# Patient Record
Sex: MALE | Race: WHITE | NOT HISPANIC OR LATINO
[De-identification: names, ages, dates, MRNs, and addresses within clinical notes are randomized per-mention and may not be internally consistent; named-entity substitution may affect disease eponyms.]

---

## 2020-12-10 ENCOUNTER — LABORATORY RESULT (OUTPATIENT)
Age: 70
End: 2020-12-10

## 2020-12-11 ENCOUNTER — APPOINTMENT (OUTPATIENT)
Dept: DISASTER EMERGENCY | Facility: CLINIC | Age: 70
End: 2020-12-11

## 2020-12-11 ENCOUNTER — OUTPATIENT (OUTPATIENT)
Dept: OUTPATIENT SERVICES | Facility: HOSPITAL | Age: 70
LOS: 1 days | End: 2020-12-11
Payer: COMMERCIAL

## 2020-12-11 VITALS
TEMPERATURE: 98 F | RESPIRATION RATE: 12 BRPM | OXYGEN SATURATION: 100 % | HEIGHT: 72 IN | HEART RATE: 58 BPM | SYSTOLIC BLOOD PRESSURE: 154 MMHG | WEIGHT: 190.04 LBS | DIASTOLIC BLOOD PRESSURE: 90 MMHG

## 2020-12-11 DIAGNOSIS — Z98.49 CATARACT EXTRACTION STATUS, UNSPECIFIED EYE: Chronic | ICD-10-CM

## 2020-12-11 DIAGNOSIS — Z01.818 ENCOUNTER FOR OTHER PREPROCEDURAL EXAMINATION: ICD-10-CM

## 2020-12-11 DIAGNOSIS — Z90.89 ACQUIRED ABSENCE OF OTHER ORGANS: Chronic | ICD-10-CM

## 2020-12-11 DIAGNOSIS — K80.10 CALCULUS OF GALLBLADDER WITH CHRONIC CHOLECYSTITIS WITHOUT OBSTRUCTION: ICD-10-CM

## 2020-12-11 DIAGNOSIS — K80.20 CALCULUS OF GALLBLADDER WITHOUT CHOLECYSTITIS WITHOUT OBSTRUCTION: ICD-10-CM

## 2020-12-11 NOTE — H&P PST ADULT - NSANTHOSAYNRD_GEN_A_CORE
No. KIKO screening performed.  STOP BANG Legend: 0-2 = LOW Risk; 3-4 = INTERMEDIATE Risk; 5-8 = HIGH Risk

## 2020-12-11 NOTE — H&P PST ADULT - NSICDXPASTSURGICALHX_GEN_ALL_CORE_FT
PAST SURGICAL HISTORY:  History of cataract extraction bilateral, 2016    History of tonsillectomy childhood

## 2020-12-11 NOTE — H&P PST ADULT - HISTORY OF PRESENT ILLNESS
69 yo male is scheduled for laparoscopic cholecystectomy on 12/14/2020 with dr Cruz at [facility].  Patient reports abdominal discomfort for which he was diagnosed with gallbladder stones and advised removal of gallbladder.  He denies any abdominal symptoms today.

## 2020-12-11 NOTE — H&P PST ADULT - NSICDXPROBLEM_GEN_ALL_CORE_FT
PROBLEM DIAGNOSES  Problem: Gallstones  Assessment and Plan: Laparoscopic cholelecystectomy is scheduled for 12/14/2020 with physical exam on admission.  Medical clearance requested.  COVID19 PCR testing is to be schedueld today at [facility] facility.  pre op instructions were reviewed including medications.  Contact info given; best wishes offered.

## 2020-12-11 NOTE — ASU DISCHARGE PLAN (ADULT/PEDIATRIC) - ASU DC SPECIAL INSTRUCTIONSFT
REST! Apply ice packs to incision sites 20 mins on, 20 mins off every 4 hours for the first 24 hours.    If taking narcotic pain medications, may take COLACE (OTC stool softener) as directed to prevent constipation.    Increase ambulation and utilize incentive spirometer as directed.  Please call Dr. CALVIN Cruz's office ([phone]) to schedule a follow-up appointment to be seen in 7-10 days.

## 2020-12-11 NOTE — H&P PST ADULT - NSANTHTIREDRD_ENT_A_CORE
[Please see my note below.] : Please see my note below. [FreeTextEntry1] : Dear Dr. JOSTIN VIRK \par I had the pleasure of evaluating your patient AMALIA TORRES, thank you for allowing us to participate in their care. please see full note detailing our visit below.\par If you have any questions, please do not hesitate to call me and I would be happy to discuss further. \par \par Gideon Jean M.D.\par Attending Physician,  \par Department of Otolaryngology - Head and Neck Surgery\par [facility] \par Office: [phone]\par Fax: [phone]\par \par  No

## 2020-12-11 NOTE — H&P PST ADULT - NSICDXFAMILYHX_GEN_ALL_CORE_FT
FAMILY HISTORY:  Father  Still living? Unknown  Family history of pancreatic cancer, Age at diagnosis: Age Unknown    Mother  Still living? No  Family history of pancreatic cancer, Age at diagnosis: Age Unknown

## 2020-12-11 NOTE — ASU DISCHARGE PLAN (ADULT/PEDIATRIC) - CARE PROVIDER_API CALL
Anjum Cruz S  SURGERY  [facility], [address] [place] [address]  Phone: [phone]  Fax: [phone]  Follow Up Time:

## 2020-12-11 NOTE — ASU DISCHARGE PLAN (ADULT/PEDIATRIC) - CALL YOUR DOCTOR IF YOU HAVE ANY OF THE FOLLOWING:
Inability to tolerate liquids or foods/Nausea and vomiting that does not stop/Unable to urinate/Fever greater than (need to indicate Fahrenheit or Celsius)/Wound/Surgical Site with redness, or foul smelling discharge or pus/Bleeding that does not stop/Swelling that gets worse/Increased irritability or sluggishness/Numbness, tingling, color or temperature change to extremity/Pain not relieved by Medications/Excessive diarrhea

## 2020-12-13 ENCOUNTER — TRANSCRIPTION ENCOUNTER (OUTPATIENT)
Age: 70
End: 2020-12-13

## 2020-12-14 ENCOUNTER — OUTPATIENT (OUTPATIENT)
Dept: OUTPATIENT SERVICES | Facility: HOSPITAL | Age: 70
LOS: 1 days | End: 2020-12-14
Payer: COMMERCIAL

## 2020-12-14 ENCOUNTER — RESULT REVIEW (OUTPATIENT)
Age: 70
End: 2020-12-14

## 2020-12-14 VITALS
HEIGHT: 72 IN | SYSTOLIC BLOOD PRESSURE: 154 MMHG | TEMPERATURE: 98 F | RESPIRATION RATE: 12 BRPM | DIASTOLIC BLOOD PRESSURE: 91 MMHG | OXYGEN SATURATION: 100 % | HEART RATE: 58 BPM | WEIGHT: 190.48 LBS

## 2020-12-14 VITALS
RESPIRATION RATE: 19 BRPM | SYSTOLIC BLOOD PRESSURE: 140 MMHG | HEART RATE: 57 BPM | DIASTOLIC BLOOD PRESSURE: 98 MMHG | OXYGEN SATURATION: 97 %

## 2020-12-14 DIAGNOSIS — Z11.59 ENCOUNTER FOR SCREENING FOR OTHER VIRAL DISEASES: ICD-10-CM

## 2020-12-14 DIAGNOSIS — K80.10 CALCULUS OF GALLBLADDER WITH CHRONIC CHOLECYSTITIS WITHOUT OBSTRUCTION: ICD-10-CM

## 2020-12-14 DIAGNOSIS — Z01.818 ENCOUNTER FOR OTHER PREPROCEDURAL EXAMINATION: ICD-10-CM

## 2020-12-14 DIAGNOSIS — Z90.89 ACQUIRED ABSENCE OF OTHER ORGANS: Chronic | ICD-10-CM

## 2020-12-14 DIAGNOSIS — Z98.49 CATARACT EXTRACTION STATUS, UNSPECIFIED EYE: Chronic | ICD-10-CM

## 2020-12-14 LAB
ABO RH CONFIRMATION: SIGNIFICANT CHANGE UP
BLD GP AB SCN SERPL QL: SIGNIFICANT CHANGE UP

## 2020-12-14 PROCEDURE — C9399: CPT

## 2020-12-14 PROCEDURE — 47562 LAPAROSCOPIC CHOLECYSTECTOMY: CPT | Mod: AS

## 2020-12-14 PROCEDURE — G0463: CPT

## 2020-12-14 PROCEDURE — 86900 BLOOD TYPING SEROLOGIC ABO: CPT

## 2020-12-14 PROCEDURE — 36415 COLL VENOUS BLD VENIPUNCTURE: CPT

## 2020-12-14 PROCEDURE — 86901 BLOOD TYPING SEROLOGIC RH(D): CPT

## 2020-12-14 PROCEDURE — C1889: CPT

## 2020-12-14 PROCEDURE — 88304 TISSUE EXAM BY PATHOLOGIST: CPT

## 2020-12-14 PROCEDURE — 47562 LAPAROSCOPIC CHOLECYSTECTOMY: CPT

## 2020-12-14 PROCEDURE — 88304 TISSUE EXAM BY PATHOLOGIST: CPT | Mod: 26

## 2020-12-14 PROCEDURE — 86850 RBC ANTIBODY SCREEN: CPT

## 2020-12-14 RX ORDER — OXYCODONE HYDROCHLORIDE 5 MG/1
5 TABLET ORAL ONCE
Refills: 0 | Status: DISCONTINUED | OUTPATIENT
Start: 2020-12-14 | End: 2020-12-14

## 2020-12-14 RX ORDER — CEFAZOLIN SODIUM 1 G
2000 VIAL (EA) INJECTION ONCE
Refills: 0 | Status: COMPLETED | OUTPATIENT
Start: 2020-12-14 | End: 2020-12-14

## 2020-12-14 RX ORDER — ONDANSETRON 8 MG/1
4 TABLET, FILM COATED ORAL ONCE
Refills: 0 | Status: DISCONTINUED | OUTPATIENT
Start: 2020-12-14 | End: 2020-12-14

## 2020-12-14 RX ORDER — ENOXAPARIN SODIUM 100 MG/ML
40 INJECTION SUBCUTANEOUS ONCE
Refills: 0 | Status: COMPLETED | OUTPATIENT
Start: 2020-12-14 | End: 2020-12-14

## 2020-12-14 RX ORDER — HYDROMORPHONE HYDROCHLORIDE 2 MG/ML
0.5 INJECTION INTRAMUSCULAR; INTRAVENOUS; SUBCUTANEOUS
Refills: 0 | Status: DISCONTINUED | OUTPATIENT
Start: 2020-12-14 | End: 2020-12-14

## 2020-12-14 RX ORDER — OXYCODONE HYDROCHLORIDE 5 MG/1
1 TABLET ORAL
Qty: 12 | Refills: 0
Start: 2020-12-14 | End: 2020-12-16

## 2020-12-14 RX ORDER — LEVOTHYROXINE SODIUM 125 MCG
1 TABLET ORAL
Qty: 0 | Refills: 0 | DISCHARGE

## 2020-12-14 RX ORDER — SODIUM CHLORIDE 9 MG/ML
1000 INJECTION, SOLUTION INTRAVENOUS
Refills: 0 | Status: DISCONTINUED | OUTPATIENT
Start: 2020-12-14 | End: 2020-12-14

## 2020-12-14 RX ORDER — CHLORHEXIDINE GLUCONATE 213 G/1000ML
1 SOLUTION TOPICAL ONCE
Refills: 0 | Status: COMPLETED | OUTPATIENT
Start: 2020-12-14 | End: 2020-12-14

## 2020-12-14 RX ADMIN — ENOXAPARIN SODIUM 40 MILLIGRAM(S): 100 INJECTION SUBCUTANEOUS at 12:00

## 2020-12-14 RX ADMIN — SODIUM CHLORIDE 75 MILLILITER(S): 9 INJECTION, SOLUTION INTRAVENOUS at 13:51

## 2020-12-14 RX ADMIN — CHLORHEXIDINE GLUCONATE 1 APPLICATION(S): 213 SOLUTION TOPICAL at 11:00

## 2020-12-14 NOTE — PROGRESS NOTE ADULT - SUBJECTIVE AND OBJECTIVE BOX
physical exam at bedside for surgery today  no complaints  vital signs stable  NPO since 12/13/2020  too synthroid today

## 2021-02-05 NOTE — ASU PATIENT PROFILE, ADULT - PRO MENTAL HEALTH SX RECENT
[employer] Approved    Filling Pharmacy: [facility]  Additional Information: Pharmacy contacted - received paid claim for a $1 copay.  Pharmacy will contact patient when medication is ready to be picked up.          none

## 2021-10-25 PROBLEM — K80.20 CALCULUS OF GALLBLADDER WITHOUT CHOLECYSTITIS WITHOUT OBSTRUCTION: Chronic | Status: ACTIVE | Noted: 2020-12-11

## 2021-10-25 PROBLEM — E03.9 HYPOTHYROIDISM, UNSPECIFIED: Chronic | Status: ACTIVE | Noted: 2020-12-11

## 2021-11-02 ENCOUNTER — APPOINTMENT (OUTPATIENT)
Dept: CARDIOLOGY | Facility: CLINIC | Age: 71
End: 2021-11-02
Payer: COMMERCIAL

## 2021-11-02 ENCOUNTER — NON-APPOINTMENT (OUTPATIENT)
Age: 71
End: 2021-11-02

## 2021-11-02 VITALS
BODY MASS INDEX: 25.47 KG/M2 | HEART RATE: 65 BPM | WEIGHT: 188 LBS | DIASTOLIC BLOOD PRESSURE: 84 MMHG | SYSTOLIC BLOOD PRESSURE: 132 MMHG | OXYGEN SATURATION: 98 % | HEIGHT: 72 IN | TEMPERATURE: 97.1 F

## 2021-11-02 DIAGNOSIS — Z78.9 OTHER SPECIFIED HEALTH STATUS: ICD-10-CM

## 2021-11-02 PROCEDURE — 93000 ELECTROCARDIOGRAM COMPLETE: CPT

## 2021-11-02 PROCEDURE — 99205 OFFICE O/P NEW HI 60 MIN: CPT

## 2021-11-02 NOTE — DISCUSSION/SUMMARY
[FreeTextEntry1] : 1) we will monitor the loop recorder from our office to evaluate for A. fib.\par 2) he will remain on aspirin and Brilinta because of the carotid stent, but may have to add Eliquis if the loop shows A. fib.\par 3) more than likely will reduce the dose of atorvastatin.\par 4) follow-up in 1 month

## 2021-11-02 NOTE — REASON FOR VISIT
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  He also had a loop recorder implanted.

## 2021-11-09 ENCOUNTER — APPOINTMENT (OUTPATIENT)
Dept: CARDIOLOGY | Facility: CLINIC | Age: 71
End: 2021-11-09
Payer: COMMERCIAL

## 2021-11-09 VITALS
BODY MASS INDEX: 26.68 KG/M2 | OXYGEN SATURATION: 98 % | HEIGHT: 72 IN | SYSTOLIC BLOOD PRESSURE: 130 MMHG | DIASTOLIC BLOOD PRESSURE: 90 MMHG | TEMPERATURE: 98.4 F | HEART RATE: 73 BPM | WEIGHT: 197 LBS

## 2021-11-09 PROCEDURE — 93291 INTERROG DEV EVAL SCRMS IP: CPT

## 2021-11-09 NOTE — PROCEDURE
[de-identified] : [facility] [de-identified] : M[facility] [de-identified] : [id number] [de-identified] : 10/14/21 [de-identified] : Loop recorder\par Tachy: >170 for >5sec\par Kwan: <30BPM or  >3 sec\par Pause: >3 sec\par AT: >110 >4 hours\par AF: >2 min\par \par 0 Episodes.  \par \par Monthly DRC.  F/U with Dr. Raymundo as scheduled. \par

## 2021-11-09 NOTE — HISTORY OF PRESENT ILLNESS
[de-identified] : Pt with hx of ischemic CVA.  Presented with Lt sided weakness and dysarthria.  Occlusion of Rt MCA s/p TPA and OLESYA stenting.  S/P [employer] ILR.  Presents for initial interrogation.

## 2021-11-29 ENCOUNTER — APPOINTMENT (OUTPATIENT)
Dept: CARDIOLOGY | Facility: CLINIC | Age: 71
End: 2021-11-29

## 2021-12-13 ENCOUNTER — NON-APPOINTMENT (OUTPATIENT)
Age: 71
End: 2021-12-13

## 2021-12-13 ENCOUNTER — APPOINTMENT (OUTPATIENT)
Dept: CARDIOLOGY | Facility: CLINIC | Age: 71
End: 2021-12-13
Payer: COMMERCIAL

## 2021-12-13 PROCEDURE — 93298 REM INTERROG DEV EVAL SCRMS: CPT

## 2021-12-13 PROCEDURE — G2066: CPT

## 2022-01-14 ENCOUNTER — APPOINTMENT (OUTPATIENT)
Dept: CARDIOLOGY | Facility: CLINIC | Age: 72
End: 2022-01-14
Payer: COMMERCIAL

## 2022-01-14 VITALS
HEIGHT: 72 IN | WEIGHT: 179 LBS | TEMPERATURE: 97.1 F | DIASTOLIC BLOOD PRESSURE: 84 MMHG | OXYGEN SATURATION: 99 % | SYSTOLIC BLOOD PRESSURE: 112 MMHG | HEART RATE: 65 BPM | BODY MASS INDEX: 24.24 KG/M2

## 2022-01-14 DIAGNOSIS — Z00.00 ENCOUNTER FOR GENERAL ADULT MEDICAL EXAMINATION W/OUT ABNORMAL FINDINGS: ICD-10-CM

## 2022-01-14 PROCEDURE — 99215 OFFICE O/P EST HI 40 MIN: CPT

## 2022-01-14 NOTE — REASON FOR VISIT
[Arrhythmia/ECG Abnorrmalities] : arrhythmia/ECG abnormalities [FreeTextEntry3] : Dr. 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  He also had a loop recorder implanted.\par He is mildly depressed about what has happened, and has lost the sense of taste, and feels that he is not back to baseline.  He has had no palpitations and no neurological symptoms.  Loop recorder does not show any evidence of A. fib.  He will undergo DEANDRE next month to evaluate for PFO, or clots in the left atrium.

## 2022-01-14 NOTE — DISCUSSION/SUMMARY
[FreeTextEntry1] : 1) we will monitor the loop recorder from our office to evaluate for A. fib.\par 2) he will remain on aspirin and Brilinta because of the carotid stent, but may have to add Eliquis if the loop shows A. fib.\par 3) more than likely will reduce the dose of atorvastatin.\par 4) follow-up in 1 month after DEANDRE

## 2022-01-17 ENCOUNTER — NON-APPOINTMENT (OUTPATIENT)
Age: 72
End: 2022-01-17

## 2022-01-18 ENCOUNTER — APPOINTMENT (OUTPATIENT)
Dept: CARDIOLOGY | Facility: CLINIC | Age: 72
End: 2022-01-18
Payer: COMMERCIAL

## 2022-01-18 PROCEDURE — 93298 REM INTERROG DEV EVAL SCRMS: CPT

## 2022-01-18 PROCEDURE — G2066: CPT

## 2022-02-02 ENCOUNTER — OUTPATIENT (OUTPATIENT)
Dept: OUTPATIENT SERVICES | Facility: HOSPITAL | Age: 72
LOS: 1 days | Discharge: HOME | End: 2022-02-02
Payer: COMMERCIAL

## 2022-02-02 DIAGNOSIS — Z98.49 CATARACT EXTRACTION STATUS, UNSPECIFIED EYE: Chronic | ICD-10-CM

## 2022-02-02 DIAGNOSIS — Z90.89 ACQUIRED ABSENCE OF OTHER ORGANS: Chronic | ICD-10-CM

## 2022-02-02 PROCEDURE — 93320 DOPPLER ECHO COMPLETE: CPT | Mod: 26

## 2022-02-02 PROCEDURE — 93325 DOPPLER ECHO COLOR FLOW MAPG: CPT | Mod: 26

## 2022-02-02 PROCEDURE — 93312 ECHO TRANSESOPHAGEAL: CPT | Mod: 26

## 2022-02-07 DIAGNOSIS — I70.0 ATHEROSCLEROSIS OF AORTA: ICD-10-CM

## 2022-02-07 DIAGNOSIS — I63.9 CEREBRAL INFARCTION, UNSPECIFIED: ICD-10-CM

## 2022-02-07 DIAGNOSIS — I42.4 ENDOCARDIAL FIBROELASTOSIS: ICD-10-CM

## 2022-02-07 DIAGNOSIS — I34.0 NONRHEUMATIC MITRAL (VALVE) INSUFFICIENCY: ICD-10-CM

## 2022-02-09 ENCOUNTER — NON-APPOINTMENT (OUTPATIENT)
Age: 72
End: 2022-02-09

## 2022-02-16 ENCOUNTER — APPOINTMENT (OUTPATIENT)
Dept: ELECTROPHYSIOLOGY | Facility: CLINIC | Age: 72
End: 2022-02-16

## 2022-02-16 ENCOUNTER — APPOINTMENT (OUTPATIENT)
Dept: CARDIOTHORACIC SURGERY | Facility: CLINIC | Age: 72
End: 2022-02-16
Payer: COMMERCIAL

## 2022-02-16 VITALS
WEIGHT: 180 LBS | BODY MASS INDEX: 24.38 KG/M2 | HEIGHT: 72 IN | RESPIRATION RATE: 13 BRPM | OXYGEN SATURATION: 98 % | DIASTOLIC BLOOD PRESSURE: 96 MMHG | HEART RATE: 80 BPM | TEMPERATURE: 98.6 F | SYSTOLIC BLOOD PRESSURE: 155 MMHG

## 2022-02-16 DIAGNOSIS — Z87.19 PERSONAL HISTORY OF OTHER DISEASES OF THE DIGESTIVE SYSTEM: ICD-10-CM

## 2022-02-16 PROCEDURE — 99214 OFFICE O/P EST MOD 30 MIN: CPT

## 2022-02-16 NOTE — ASSESSMENT
[FreeTextEntry1] : Matt is a 71 year old male who was seen for initial consultation by Dr. PRESTON Raymundo 11/2021 after he was hospitalized for stroke in 10/2021. He was treated at [facility] with thrombolytics and thrombectomy and stenting of his right internal carotid artery. He was discharged to home on Brilinta and atorvastatin. 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 Mild MR.  No thrombus is seen in the left atrium or left atrium appendage.  Simple aortic atherosclerosis. He was referred for surgical consultation. He feels well today, denies chest pain, shortness of breath, dyspnea on exertion, palpitations, orthopnea, paroxysmal nocturnal dyspnea, claudication, presyncopal, or syncopal symptoms. No focal deficits noted on today's exam. \par \par I have reviewed the patient's medical records, diagnostic images during the time of this office consultation and have made the following recommendation. 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 This does not require surgical intervention. I would not recommend repeat imaging at this time.\par \par Will ask Dr. Marvin to evaluate the carotid Doppler/cerebral angiogram films status post carotid stent. \par \par Plan: \par 1. Continue dual antiplatelet therapy\par 2. Continue medication regimen.\par 3. Follow up with cardiologist and PCP.\par \par

## 2022-02-16 NOTE — PHYSICAL EXAM
[General Appearance - Alert] : alert [General Appearance - In No Acute Distress] : in no acute distress [Jugular Venous Distention Increased] : there was no jugular-venous distention [] : no respiratory distress [Respiration, Rhythm And Depth] : normal respiratory rhythm and effort [Heart Rate And Rhythm] : heart rate was normal and rhythm regular [Murmurs] : no murmurs [Examination Of The Chest] : the chest was normal in appearance [Bowel Sounds] : normal bowel sounds [Abdomen Soft] : soft [No CVA Tenderness] : no ~M costovertebral angle tenderness [Involuntary Movements] : no involuntary movements were seen [Skin Color & Pigmentation] : normal skin color and pigmentation [No Focal Deficits] : no focal deficits [Oriented To Time, Place, And Person] : oriented to person, place, and time [Impaired Insight] : insight and judgment were intact [Sclera] : the sclera and conjunctiva were normal [Right Carotid Bruit] : no bruit heard over the right carotid [Left Carotid Bruit] : no bruit heard over the left carotid [FreeTextEntry1] : deferred

## 2022-02-16 NOTE — HISTORY OF PRESENT ILLNESS
[FreeTextEntry1] : Matt is a 71 year old male who was seen for initial consultation by Dr. PRESTON Raymundo 11/2021 after he was hospitalized for stroke in 10/2021. He was treated at [facility] with thrombolytics and thrombectomy and stenting of his right internal carotid artery. He was discharged to home on Brilinta and atorvastatin. 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 Mild MR.  No thrombus is seen in the left atrium or left atrium appendage.  Simple aortic atherosclerosis. He was referred for surgical consultation. He feels well today, denies chest pain, shortness of breath, dyspnea on exertion, palpitations, orthopnea, paroxysmal nocturnal dyspnea, claudication, presyncopal, or syncopal symptoms. No focal deficits noted on today's exam. \par \par \par

## 2022-02-16 NOTE — CONSULT LETTER
[Dear  ___] : Dear ~SHERRELL, [Courtesy Letter:] : I had the pleasure of seeing your patient, [unfilled], in my office today. [Please see my note below.] : Please see my note below. [Consult Closing:] : Thank you very much for allowing me to participate in the care of this patient.  If you have any questions, please do not hesitate to contact me. [Sincerely,] : Sincerely, [FreeTextEntry2] : Jerry Jurado MD\par [facility]\par [address] [place] [address]\[place] [address] [FreeTextEntry3] : Alan Sheppard MD\par  & \par \par Cardiovascular & Thoracic Surgery\par [facility] \par [address]\par [facility] [address]\par

## 2022-02-16 NOTE — REVIEW OF SYSTEMS
[Feeling Tired] : feeling tired [Easy Bleeding] : a tendency for easy bleeding [Negative] : Genitourinary [Chest Pain] : no chest pain [SOB on Exertion] : no shortness of breath during exertion [Dizziness] : no dizziness [Fainting] : no fainting [Anxiety] : no anxiety [Depression] : no depression

## 2022-02-16 NOTE — REASON FOR VISIT
[Initial Evaluation] : an initial evaluation [Spouse] : spouse [Family Member] : family member [FreeTextEntry1] : questionable fibroelastoma?

## 2022-02-16 NOTE — DATA REVIEWED
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 \par Mild MR. \par No thrombus is seen in the left atrium or left atrium appendage. \par Simple aortic atherosclerosis \par \par Carotid duplex\par 1/12/2022\par Carotid artery duplex performed.\par Right internal carotid artery stent was patent with no evidence of significant flow disturbance.\par Left internal carotid artery was patent as well. \par External carotid arteries were normal.\par Vertebral arteries demonstrated antegrade flow. \par \par

## 2022-02-17 ENCOUNTER — APPOINTMENT (OUTPATIENT)
Dept: CARDIOLOGY | Facility: CLINIC | Age: 72
End: 2022-02-17

## 2022-02-22 ENCOUNTER — APPOINTMENT (OUTPATIENT)
Dept: CARDIOLOGY | Facility: CLINIC | Age: 72
End: 2022-02-22
Payer: COMMERCIAL

## 2022-02-22 ENCOUNTER — NON-APPOINTMENT (OUTPATIENT)
Age: 72
End: 2022-02-22

## 2022-02-22 PROCEDURE — 93298 REM INTERROG DEV EVAL SCRMS: CPT

## 2022-02-22 PROCEDURE — G2066: CPT

## 2022-03-15 ENCOUNTER — APPOINTMENT (OUTPATIENT)
Dept: CARDIOLOGY | Facility: CLINIC | Age: 72
End: 2022-03-15

## 2022-03-28 ENCOUNTER — APPOINTMENT (OUTPATIENT)
Dept: CARDIOLOGY | Facility: CLINIC | Age: 72
End: 2022-03-28
Payer: COMMERCIAL

## 2022-03-28 ENCOUNTER — NON-APPOINTMENT (OUTPATIENT)
Age: 72
End: 2022-03-28

## 2022-03-28 PROCEDURE — G2066: CPT

## 2022-03-28 PROCEDURE — 93298 REM INTERROG DEV EVAL SCRMS: CPT

## 2022-04-05 ENCOUNTER — APPOINTMENT (OUTPATIENT)
Dept: CARDIOLOGY | Facility: CLINIC | Age: 72
End: 2022-04-05
Payer: COMMERCIAL

## 2022-04-05 VITALS
DIASTOLIC BLOOD PRESSURE: 76 MMHG | SYSTOLIC BLOOD PRESSURE: 130 MMHG | BODY MASS INDEX: 23.7 KG/M2 | OXYGEN SATURATION: 98 % | HEIGHT: 72 IN | TEMPERATURE: 98 F | HEART RATE: 83 BPM | WEIGHT: 175 LBS

## 2022-04-05 PROCEDURE — 99214 OFFICE O/P EST MOD 30 MIN: CPT

## 2022-04-05 RX ORDER — ASPIRIN ENTERIC COATED TABLETS 81 MG 81 MG/1
81 TABLET, DELAYED RELEASE ORAL DAILY
Refills: 0 | Status: ACTIVE | COMMUNITY

## 2022-04-05 RX ORDER — ATORVASTATIN CALCIUM 20 MG/1
20 TABLET, FILM COATED ORAL
Refills: 0 | Status: ACTIVE | COMMUNITY

## 2022-04-09 NOTE — HISTORY OF PRESENT ILLNESS
[FreeTextEntry1] : PRASHANTH ANDERSON  is a 72 year old  M\par Primary physician Dr. Bethea. Follows with Dr. Lucas of neurosurgery and neurologist at [facility]. \par History of hypothyroidism on replacement and prior cholecystectomy. \par Baseline active as a farmer. Rarely drinks alcohol. \par Father had TIA. \par \par October 2021 he was unable to stand and speak. 911 was called. Mobile stroke unit. Reportedly given tpa,  Went to [facility]. 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 \par \par ILR has not demonstrated any clinical events\par No bleeding issues. \par Has neurosurgery follow-up scheduled next week.\par DEANDRE demonstrated normal EF with mild mitral regurgitation no thrombus simple aortic atherosclerosis and aortic valve abnormality\par Subsequent follow-up with Dr. Sheppard. nl variant\par \par Since the event he has lost weight. He is slow down. He has less energy. He has lightheadedness and poor taste\par \par There is no prior history of a clinical myocardial infarction, coronary revascularization. \par There is no history of symptomatic congestive heart failure rheumatic heart disease\par There is no history of symptomatic arrhythmias including atrial fibrillation.\par  \par There is no exertional chest pain, pressure or discomfort. \par There is no significant dyspnea on exertion or orthopnea. \par There are no symptomatic palpitations, lightheadedness, dizziness or syncope.\par

## 2022-04-09 NOTE — ASSESSMENT
[FreeTextEntry1] : acute cva right ICA MCA occlusion s/p TPA thrombectomy and right ICA stenting \par s/p iLR \par Prior event believed to be related to carotid dissection.  Case was discussed with neurology and neurovascular.  \par Follow-up with neurology, NSY as scheduled.  \par Also will refer to hematology to rule out hypercoagulable disorder.\par continue antiplatelet and statin\par

## 2022-04-21 ENCOUNTER — APPOINTMENT (OUTPATIENT)
Dept: HEMATOLOGY ONCOLOGY | Facility: CLINIC | Age: 72
End: 2022-04-21

## 2022-05-02 ENCOUNTER — APPOINTMENT (OUTPATIENT)
Dept: CARDIOLOGY | Facility: CLINIC | Age: 72
End: 2022-05-02
Payer: COMMERCIAL

## 2022-05-02 ENCOUNTER — NON-APPOINTMENT (OUTPATIENT)
Age: 72
End: 2022-05-02

## 2022-05-02 ENCOUNTER — OUTPATIENT (OUTPATIENT)
Dept: OUTPATIENT SERVICES | Facility: HOSPITAL | Age: 72
LOS: 1 days | End: 2022-05-02
Payer: COMMERCIAL

## 2022-05-02 DIAGNOSIS — D64.9 ANEMIA, UNSPECIFIED: ICD-10-CM

## 2022-05-02 DIAGNOSIS — Z90.89 ACQUIRED ABSENCE OF OTHER ORGANS: Chronic | ICD-10-CM

## 2022-05-02 DIAGNOSIS — Z98.49 CATARACT EXTRACTION STATUS, UNSPECIFIED EYE: Chronic | ICD-10-CM

## 2022-05-02 PROCEDURE — 93298 REM INTERROG DEV EVAL SCRMS: CPT

## 2022-05-02 PROCEDURE — G2066: CPT

## 2022-05-03 ENCOUNTER — RESULT REVIEW (OUTPATIENT)
Age: 72
End: 2022-05-03

## 2022-05-03 ENCOUNTER — APPOINTMENT (OUTPATIENT)
Dept: HEMATOLOGY ONCOLOGY | Facility: CLINIC | Age: 72
End: 2022-05-03
Payer: COMMERCIAL

## 2022-05-03 ENCOUNTER — LABORATORY RESULT (OUTPATIENT)
Age: 72
End: 2022-05-03

## 2022-05-03 VITALS
WEIGHT: 179.2 LBS | OXYGEN SATURATION: 96 % | DIASTOLIC BLOOD PRESSURE: 87 MMHG | TEMPERATURE: 98.8 F | BODY MASS INDEX: 24.27 KG/M2 | HEART RATE: 68 BPM | HEIGHT: 72 IN | SYSTOLIC BLOOD PRESSURE: 137 MMHG

## 2022-05-03 LAB
BASOPHILS # BLD AUTO: 0.01 K/UL — SIGNIFICANT CHANGE UP (ref 0–0.2)
BASOPHILS NFR BLD AUTO: 0.1 % — SIGNIFICANT CHANGE UP (ref 0–2)
EOSINOPHIL # BLD AUTO: 0.14 K/UL — SIGNIFICANT CHANGE UP (ref 0–0.5)
EOSINOPHIL NFR BLD AUTO: 1.8 % — SIGNIFICANT CHANGE UP (ref 0–6)
HCT VFR BLD CALC: 44.5 % — SIGNIFICANT CHANGE UP (ref 39–50)
HGB BLD-MCNC: 15 G/DL — SIGNIFICANT CHANGE UP (ref 13–17)
IMM GRANULOCYTES NFR BLD AUTO: 0.3 % — SIGNIFICANT CHANGE UP (ref 0–1.5)
LYMPHOCYTES # BLD AUTO: 1.29 K/UL — SIGNIFICANT CHANGE UP (ref 1–3.3)
LYMPHOCYTES # BLD AUTO: 16.8 % — SIGNIFICANT CHANGE UP (ref 13–44)
MCHC RBC-ENTMCNC: 32.3 PG — SIGNIFICANT CHANGE UP (ref 27–34)
MCHC RBC-ENTMCNC: 33.7 GM/DL — SIGNIFICANT CHANGE UP (ref 32–36)
MCV RBC AUTO: 95.9 FL — SIGNIFICANT CHANGE UP (ref 80–100)
MONOCYTES # BLD AUTO: 0.76 K/UL — SIGNIFICANT CHANGE UP (ref 0–0.9)
MONOCYTES NFR BLD AUTO: 9.9 % — SIGNIFICANT CHANGE UP (ref 2–14)
NEUTROPHILS # BLD AUTO: 5.47 K/UL — SIGNIFICANT CHANGE UP (ref 1.8–7.4)
NEUTROPHILS NFR BLD AUTO: 71.1 % — SIGNIFICANT CHANGE UP (ref 43–77)
NRBC # BLD: 0 /100 WBCS — SIGNIFICANT CHANGE UP (ref 0–0)
PLATELET # BLD AUTO: 254 K/UL — SIGNIFICANT CHANGE UP (ref 150–400)
RBC # BLD: 4.64 M/UL — SIGNIFICANT CHANGE UP (ref 4.2–5.8)
RBC # FLD: 12.7 % — SIGNIFICANT CHANGE UP (ref 10.3–14.5)
WBC # BLD: 7.69 K/UL — SIGNIFICANT CHANGE UP (ref 3.8–10.5)
WBC # FLD AUTO: 7.69 K/UL — SIGNIFICANT CHANGE UP (ref 3.8–10.5)

## 2022-05-03 PROCEDURE — 85027 COMPLETE CBC AUTOMATED: CPT

## 2022-05-03 PROCEDURE — 99204 OFFICE O/P NEW MOD 45 MIN: CPT

## 2022-05-03 PROCEDURE — 36415 COLL VENOUS BLD VENIPUNCTURE: CPT

## 2022-05-03 RX ORDER — LEVOTHYROXINE SODIUM 0.09 MG/1
88 TABLET ORAL DAILY
Refills: 0 | Status: DISCONTINUED | COMMUNITY
End: 2022-05-03

## 2022-05-03 RX ORDER — TICAGRELOR 90 MG/1
90 TABLET ORAL TWICE DAILY
Qty: 180 | Refills: 3 | Status: DISCONTINUED | COMMUNITY
End: 2022-05-03

## 2022-05-04 ENCOUNTER — NON-APPOINTMENT (OUTPATIENT)
Age: 72
End: 2022-05-04

## 2022-05-04 LAB
ALBUMIN SERPL ELPH-MCNC: 4.3 G/DL
ALP BLD-CCNC: 96 U/L
ALT SERPL-CCNC: 29 U/L
ANION GAP SERPL CALC-SCNC: 11 MMOL/L
APTT BLD: 32.9 SEC
AST SERPL-CCNC: 30 U/L
AT III PPP CHRO-ACNC: 90 %
B2 GLYCOPROT1 AB SER QL: NEGATIVE
BILIRUB SERPL-MCNC: 0.7 MG/DL
BUN SERPL-MCNC: 13 MG/DL
CALCIUM SERPL-MCNC: 9.2 MG/DL
CARDIOLIPIN AB SER IA-ACNC: NEGATIVE
CHLORIDE SERPL-SCNC: 105 MMOL/L
CO2 SERPL-SCNC: 25 MMOL/L
CREAT SERPL-MCNC: 0.74 MG/DL
DEPRECATED D DIMER PPP IA-ACNC: 158 NG/ML DDU
EGFR: 96 ML/MIN/1.73M2
FACT VIII ACT/NOR PPP: 80 %
FOLATE SERPL-MCNC: 17.4 NG/ML
GLUCOSE SERPL-MCNC: 93 MG/DL
INR PPP: 1.09 RATIO
POTASSIUM SERPL-SCNC: 4.4 MMOL/L
PROT C PPP CHRO-ACNC: 99 %
PROT SERPL-MCNC: 6.8 G/DL
PT BLD: 12.7 SEC
SODIUM SERPL-SCNC: 141 MMOL/L
VIT B12 SERPL-MCNC: 528 PG/ML

## 2022-05-04 RX ORDER — LEVOTHYROXINE SODIUM 0.1 MG/1
100 TABLET ORAL
Refills: 0 | Status: ACTIVE | COMMUNITY

## 2022-05-04 NOTE — RESULTS/DATA
[FreeTextEntry1] : Referred by: Dr. Jerry Jurado \par Here today with his wife  ([place] [phone])\par \par Mr. Durham presented at age 72 in May 2022 for evaluation of acute R ICA MCA s/p TPA thrombectomy and right ICA stenting, here for hypercoagulable workup. \par The patient has a medical history of HLD. \par \par Hypercoagulable workup as below. \par B12/folate sufficient. \par

## 2022-05-04 NOTE — REASON FOR VISIT
[Initial Consultation] : an initial consultation for [FreeTextEntry2] : CVA, rule out hypercoagulable state

## 2022-05-04 NOTE — PHYSICAL EXAM
[Fully active, able to carry on all pre-disease performance without restriction] : Status 0 - Fully active, able to carry on all pre-disease performance without restriction [Normal] : affect appropriate [de-identified] : well appearing male, NAD, pleasant

## 2022-05-04 NOTE — HISTORY OF PRESENT ILLNESS
[de-identified] : Referred by: Dr. Jerry Jurado \par Here today with his wife  ([place] [phone])\par \par Mr. Durham presented at age 72 in May 2022 for evaluation of acute R ICA MCA s/p TPA thrombectomy and right ICA stenting, here for hypercoagulable workup. \par The patient has a medical history of HLD. \par \par The patient follows with Dr. Jurado for acute cva right ICA MCA occlusion s/p TPA thrombectomy and right ICA stenting, who referred the patient to hematology to rule out underlying hypercoagulable disorder. He developed aphasia and inability to  October 2021, received TPA by mobile stroke unit. CTA at [place] revealed acute stroke, occlusion of right cervical internal carotid artery, right middle cerebral artery, s/p right carotid and middle cerebral artery thrombectomy with restoration of flow to the right anterior circulation underwent stenting of an occluded right cervical carotid artery. Inpatient TTE showed normal LVEF. Loop recorder has not demonstrated any events. He remains on antiplatelet and statin therapy (ASA 81mg daily, atorvastatin 20mg daily). He has stopped brilinta. \par \par At today's visit Matt reports that he is feeling generally well.  He does have some mild fatigue.  He continues to work as a farmer.  His appetite has improved.  He denies fevers, chills, chest pain, shortness of breath, nausea, vomiting, diarrhea, lower extremity swelling.\par \par Laboratory studies reviewed at today's visit and notable for: WBC 7.69, Hb 15, Plt 254\par PTT wnl, D-dimer 158, CMP wnl, B12/folate wnl, PSA 1.60 (wnl)\par Laboratory studies from 4/16/22 reviewed and notable for: LA negative, Total cholesterol 150\par \par HCM: \par - COVID vaccination: s/p booster \par - Colonoscopy: most recently in 2018, no polyps \par \par SH: \par - Occupation: works as a farmer, previously worked as an  \par - Living situation: Lives in [place] with his wife [place], he has 3 children \par - Smoking/etoh/illicits: never smoker, rare etoh, denies illicits\par - Exercise: physically active, works as a farmer \par \par FH: \par - Father had a TIA in his 70s \par - Father had pancreatic cancer age 77 \par - Mother had pancreatic cancer age 72

## 2022-05-04 NOTE — CONSULT LETTER
[Dear  ___] : Dear  [unfilled], [Consult Letter:] : I had the pleasure of evaluating your patient, [unfilled]. [Please see my note below.] : Please see my note below. [Consult Closing:] : Thank you very much for allowing me to participate in the care of this patient.  If you have any questions, please do not hesitate to contact me. [Sincerely,] : Sincerely, [FreeTextEntry2] : Dr. Jerry Jurado [FreeTextEntry3] : Dr. Lilli Mccrary\par

## 2022-05-05 LAB — PROT S AG ACT/NOR PPP IA: 86 %

## 2022-05-06 LAB
APCR PPP: 3.02 RATIO
FVL BLANK: 41.1
FVL TEST: 124.2
PROT S FREE PPP-ACNC: 77 %

## 2022-05-09 LAB
DNA PLOIDY SPEC FC-IMP: NORMAL
PTR INTERP: NORMAL

## 2022-05-16 ENCOUNTER — APPOINTMENT (OUTPATIENT)
Dept: HEMATOLOGY ONCOLOGY | Facility: CLINIC | Age: 72
End: 2022-05-16
Payer: COMMERCIAL

## 2022-05-16 VITALS
HEIGHT: 72 IN | WEIGHT: 178 LBS | SYSTOLIC BLOOD PRESSURE: 129 MMHG | OXYGEN SATURATION: 95 % | HEART RATE: 78 BPM | DIASTOLIC BLOOD PRESSURE: 82 MMHG | TEMPERATURE: 97.2 F | BODY MASS INDEX: 24.11 KG/M2

## 2022-05-16 PROCEDURE — 99214 OFFICE O/P EST MOD 30 MIN: CPT

## 2022-05-16 NOTE — PHYSICAL EXAM
[Fully active, able to carry on all pre-disease performance without restriction] : Status 0 - Fully active, able to carry on all pre-disease performance without restriction [Normal] : affect appropriate [de-identified] : well appearing male, NAD, pleasant

## 2022-05-16 NOTE — HISTORY OF PRESENT ILLNESS
[de-identified] : Referred by: Dr. Jerry Jurado \par Here today with his wife  ([place] [phone])\par \par Mr. Durham presented at age 72 in May 2022 for evaluation of acute R ICA MCA s/p TPA thrombectomy and right ICA stenting, here for hypercoagulable workup. \par The patient has a medical history of HLD. \par \par Presenting HPI: The patient follows with Dr. Jurado for acute cva right ICA MCA occlusion s/p TPA thrombectomy and right ICA stenting, who referred the patient to hematology to rule out underlying hypercoagulable disorder. He developed aphasia and inability to  October 2021, received TPA by mobile stroke unit. CTA at [place] revealed acute stroke, occlusion of right cervical internal carotid artery, right middle cerebral artery, s/p right carotid and middle cerebral artery thrombectomy with restoration of flow to the right anterior circulation underwent stenting of an occluded right cervical carotid artery. Inpatient TTE showed normal LVEF. Loop recorder has not demonstrated any events. He remains on antiplatelet and statin therapy (ASA 81mg daily, atorvastatin 20mg daily). He has stopped brilinta. \par \par At today's visit Matt reports that he is feeling generally well.  He does have some mild fatigue.  He continues to work as a farmer.  His appetite has improved.  He denies fevers, chills, chest pain, shortness of breath, nausea, vomiting, diarrhea, lower extremity swelling.\par \par Laboratory studies reviewed at today's visit and notable for: WBC 7.69, Hb 15, Plt 254\par PTT wnl, D-dimer 158, CMP wnl, B12/folate wnl, PSA 1.60 (wnl)\par Laboratory studies from 4/16/22 reviewed and notable for: LA negative, Total cholesterol 150\par \par HCM: \par - COVID vaccination: s/p booster \par - Colonoscopy: most recently in 2018, no polyps \par \par SH: \par - Occupation: works as a farmer, previously worked as an  \par - Living situation: Lives in [place] with his wife [place], he has 3 children \par - Smoking/etoh/illicits: never smoker, rare etoh, denies illicits\par - Exercise: physically active, works as a farmer \par \par FH: \par - Father had a TIA in his 70s \par - Father had pancreatic cancer age 77 \par - Mother had pancreatic cancer age 72 [de-identified] : Matt presents for follow up on 5/16/22 for stroke, rule out hypercoagulable state. \par \par He remains on aspirin and plavix. Hypercoagulable workup negative-  AT III activity normal, factor VIII normal 80%, protein C normal, protein S normal \par B2G neg, anticardiolipin Ab neg, Factor V leiden and prothrombin gene mutation negative. D-dimer 158, PSA normal 1.6. B12/folate normal.\par \par At today's visit he states that he is feeling generally well. His wife believes he has lost a few pounds recently. At today's visit on our scale he is about stable since January 2022. He will continue to monitor his weight and return to clinic if weight loss persists. He denies fevers, night sweats, lymphadenopathy, abdominal pain, nausea, vomiting. His appetite has been poor since his stroke.

## 2022-05-16 NOTE — CONSULT LETTER
[Dear  ___] : Dear  [unfilled], [Consult Letter:] : I had the pleasure of evaluating your patient, [unfilled]. [Please see my note below.] : Please see my note below. [Consult Closing:] : Thank you very much for allowing me to participate in the care of this patient.  If you have any questions, please do not hesitate to contact me. [Sincerely,] : Sincerely, [FreeTextEntry2] : Dr. Jerry Jurado [FreeTextEntry3] : Dr. Lilli Mccrary\par  [DrJhonathan  ___] : Dr. MEZA

## 2022-05-16 NOTE — RESULTS/DATA
[FreeTextEntry1] : Referred by: Dr. Jerry Jurado \par Here today with his wife  ([place] [phone])\par \par Mr. Durham presented at age 72 in May 2022 for evaluation of acute R ICA MCA s/p TPA thrombectomy and right ICA stenting, here for hypercoagulable workup. \par The patient has a medical history of HLD. \par \par Hypercoagulable workup negative. \par B12/folate sufficient. \par Possible weight loss- he will monitor at home and return to clinic if weight loss persists. \par No other signs/symptoms of malignancy. Recent PSA normal. \par Encouraged repeat colonoscopy.

## 2022-06-06 ENCOUNTER — NON-APPOINTMENT (OUTPATIENT)
Age: 72
End: 2022-06-06

## 2022-06-06 ENCOUNTER — APPOINTMENT (OUTPATIENT)
Dept: CARDIOLOGY | Facility: CLINIC | Age: 72
End: 2022-06-06
Payer: COMMERCIAL

## 2022-06-06 PROCEDURE — G2066: CPT

## 2022-06-06 PROCEDURE — 93298 REM INTERROG DEV EVAL SCRMS: CPT

## 2022-07-11 ENCOUNTER — APPOINTMENT (OUTPATIENT)
Dept: CARDIOLOGY | Facility: CLINIC | Age: 72
End: 2022-07-11

## 2022-07-12 ENCOUNTER — NON-APPOINTMENT (OUTPATIENT)
Age: 72
End: 2022-07-12

## 2022-07-13 ENCOUNTER — APPOINTMENT (OUTPATIENT)
Dept: CARDIOLOGY | Facility: CLINIC | Age: 72
End: 2022-07-13

## 2022-07-13 PROCEDURE — 93298 REM INTERROG DEV EVAL SCRMS: CPT

## 2022-07-13 PROCEDURE — G2066: CPT

## 2022-08-01 ENCOUNTER — APPOINTMENT (OUTPATIENT)
Dept: ELECTROPHYSIOLOGY | Facility: CLINIC | Age: 72
End: 2022-08-01

## 2022-08-15 ENCOUNTER — NON-APPOINTMENT (OUTPATIENT)
Age: 72
End: 2022-08-15

## 2022-08-15 ENCOUNTER — APPOINTMENT (OUTPATIENT)
Dept: CARDIOLOGY | Facility: CLINIC | Age: 72
End: 2022-08-15

## 2022-08-15 PROCEDURE — G2066: CPT

## 2022-08-15 PROCEDURE — 93298 REM INTERROG DEV EVAL SCRMS: CPT

## 2022-09-24 ENCOUNTER — NON-APPOINTMENT (OUTPATIENT)
Age: 72
End: 2022-09-24

## 2022-09-26 ENCOUNTER — APPOINTMENT (OUTPATIENT)
Dept: CARDIOLOGY | Facility: CLINIC | Age: 72
End: 2022-09-26

## 2022-09-26 PROCEDURE — 93298 REM INTERROG DEV EVAL SCRMS: CPT

## 2022-09-26 PROCEDURE — G2066: CPT

## 2022-09-30 ENCOUNTER — NON-APPOINTMENT (OUTPATIENT)
Age: 72
End: 2022-09-30

## 2022-09-30 ENCOUNTER — APPOINTMENT (OUTPATIENT)
Dept: CARDIOLOGY | Facility: CLINIC | Age: 72
End: 2022-09-30

## 2022-09-30 VITALS
HEART RATE: 74 BPM | SYSTOLIC BLOOD PRESSURE: 112 MMHG | TEMPERATURE: 97.3 F | BODY MASS INDEX: 22.75 KG/M2 | OXYGEN SATURATION: 98 % | WEIGHT: 168 LBS | HEIGHT: 72 IN | DIASTOLIC BLOOD PRESSURE: 68 MMHG

## 2022-09-30 PROCEDURE — 93000 ELECTROCARDIOGRAM COMPLETE: CPT

## 2022-09-30 PROCEDURE — 99214 OFFICE O/P EST MOD 30 MIN: CPT

## 2022-09-30 NOTE — HISTORY OF PRESENT ILLNESS
[FreeTextEntry1] : PRASHANTH ANDERSON  is a 72 year old  M\par Primary physician Dr. Bethea. Follows with Dr. Lucas of neurosurgery and neurologist at [place]. \par History of hypothyroidism on replacement and prior cholecystectomy. \par Baseline active as a farmer. Rarely drinks alcohol. \par Father had TIA. \par \par October 2021 he was unable to stand and speak. 911 was called. Mobile stroke unit. Reportedly given tpa,  Went to [place]. 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 \par \par ILR has not demonstrated any AF, reviewed up to date transmissions up to 9/29/22 \par No bleeding issues. \par Has routine neurosurgery follow-up scheduled next week.\par DEANDRE 2/2022 demonstrated normal EF with mild mitral regurgitation no thrombus simple aortic atherosclerosis and aortic valve abnormality\par Subsequent follow-up with Dr. Sheppard. nl variant\par Saw hematology ruled out for hypercoagulable d/o. \par \par There is no prior history of a clinical myocardial infarction, coronary revascularization. \par There is no history of symptomatic congestive heart failure rheumatic heart disease\par There is no history of symptomatic arrhythmias including atrial fibrillation.\par  \par He is working as a farmer, feeling good\par There is no exertional chest pain, pressure or discomfort. \par There is no significant dyspnea on exertion or orthopnea. \par There are no symptomatic palpitations, lightheadedness, dizziness or syncope.\par \par There has been no recent illness or hospitalization. \par He requests ILR explant. \par \par

## 2022-09-30 NOTE — ADDENDUM
[FreeTextEntry1] : Please note the patient was reviewed with NP Charity Abarca.\par I was physically present during the service of the patient.\par I was directly involved in the management plan and recommendations of the care provided to the patient. \par I personally reviewed the history and physical examination as documented by the NP above.\par \par

## 2022-10-11 LAB — SARS-COV-2 N GENE NPH QL NAA+PROBE: NOT DETECTED

## 2022-10-24 LAB — SARS-COV-2 N GENE NPH QL NAA+PROBE: NOT DETECTED

## 2022-10-31 ENCOUNTER — APPOINTMENT (OUTPATIENT)
Dept: CARDIOLOGY | Facility: CLINIC | Age: 72
End: 2022-10-31

## 2022-11-02 ENCOUNTER — APPOINTMENT (OUTPATIENT)
Dept: CARDIOLOGY | Facility: CLINIC | Age: 72
End: 2022-11-02

## 2022-11-02 VITALS
WEIGHT: 176 LBS | BODY MASS INDEX: 23.87 KG/M2 | DIASTOLIC BLOOD PRESSURE: 80 MMHG | HEART RATE: 67 BPM | SYSTOLIC BLOOD PRESSURE: 110 MMHG | OXYGEN SATURATION: 95 % | TEMPERATURE: 97.8 F

## 2022-11-02 PROCEDURE — ZZZZZ: CPT

## 2023-01-18 NOTE — H&P PST ADULT - RESPIRATORY AND THORAX
I spoke with pt. Diffuse rash. Pt recently received his 4th injection of entyvio. No other new medications past month. Denies chest pain or SOB or abd pain. Pt feels colitis/diarrhea unchanged on the medication. Recommended Medrol dosepack (script sent to pharmacy), and benadryl q 8 hrs. Pt to update status next week or sooner if symptoms progress.   negative

## 2023-08-31 NOTE — H&P PST ADULT - SPO2 (%)
Working on curbing daily smoking. Currently 1/2 pack from 1 pack per day.   - encouraged continued reduction in daily intake  - declines referral to smoking cessation specialist or medication intervention     100

## 2023-09-29 ENCOUNTER — APPOINTMENT (OUTPATIENT)
Dept: CARDIOLOGY | Facility: CLINIC | Age: 73
End: 2023-09-29
Payer: MEDICARE

## 2023-09-29 ENCOUNTER — NON-APPOINTMENT (OUTPATIENT)
Age: 73
End: 2023-09-29

## 2023-09-29 VITALS
WEIGHT: 166 LBS | HEIGHT: 72 IN | BODY MASS INDEX: 22.48 KG/M2 | OXYGEN SATURATION: 98 % | HEART RATE: 70 BPM | SYSTOLIC BLOOD PRESSURE: 110 MMHG | DIASTOLIC BLOOD PRESSURE: 80 MMHG

## 2023-09-29 PROCEDURE — 99214 OFFICE O/P EST MOD 30 MIN: CPT

## 2023-10-16 ENCOUNTER — APPOINTMENT (OUTPATIENT)
Dept: CARDIOLOGY | Facility: CLINIC | Age: 73
End: 2023-10-16
Payer: MEDICARE

## 2023-10-16 PROCEDURE — 93015 CV STRESS TEST SUPVJ I&R: CPT

## 2023-10-27 ENCOUNTER — APPOINTMENT (OUTPATIENT)
Dept: CARDIOLOGY | Facility: CLINIC | Age: 73
End: 2023-10-27
Payer: MEDICARE

## 2023-10-27 VITALS
OXYGEN SATURATION: 97 % | SYSTOLIC BLOOD PRESSURE: 134 MMHG | WEIGHT: 174 LBS | DIASTOLIC BLOOD PRESSURE: 82 MMHG | HEART RATE: 78 BPM | BODY MASS INDEX: 23.57 KG/M2 | HEIGHT: 72 IN

## 2023-10-27 DIAGNOSIS — Z95.828 PRESENCE OF OTHER VASCULAR IMPLANTS AND GRAFTS: ICD-10-CM

## 2023-10-27 DIAGNOSIS — Z98.890 OTHER SPECIFIED POSTPROCEDURAL STATES: ICD-10-CM

## 2023-10-27 DIAGNOSIS — I67.9 CEREBROVASCULAR DISEASE, UNSPECIFIED: ICD-10-CM

## 2023-10-27 PROCEDURE — 99214 OFFICE O/P EST MOD 30 MIN: CPT

## 2024-01-31 ENCOUNTER — APPOINTMENT (OUTPATIENT)
Dept: DERMATOLOGY | Facility: CLINIC | Age: 74
End: 2024-01-31
Payer: MEDICARE

## 2024-01-31 PROCEDURE — 99203 OFFICE O/P NEW LOW 30 MIN: CPT

## 2024-01-31 RX ORDER — TRIAMCINOLONE ACETONIDE 1 MG/G
0.1 CREAM TOPICAL
Qty: 1 | Refills: 3 | Status: ACTIVE | COMMUNITY
Start: 2024-01-31 | End: 1900-01-01

## 2024-01-31 NOTE — PHYSICAL EXAM
[Alert] : alert [Oriented x 3] : ~L oriented x 3 [Well Nourished] : well nourished [FreeTextEntry3] : Type II skin  Diffuse symmetric dull erythematous scaly patches on the arms, trunk, (especially back), focally on thighs and more diffuse on the legs  Also present diffusely in ophiasic scalp-not symptomatic

## 2024-01-31 NOTE — ASSESSMENT
[FreeTextEntry1] : Rash is consistent with an eczematous dermatitis of some type of uncertain etiology

## 2024-01-31 NOTE — HISTORY OF PRESENT ILLNESS
[FreeTextEntry1] : Rash [de-identified] : First visit for 73-year-old white male with a few year history of a slowly progressive episodically mildly itchy rash starting on the legs and spreading to the arms and trunk.  Previously treated by primary care physician with: Triamcinolone ointment 0.1% 10% sodium sulfacetamide 5% sulfur cream  Patient is able to sleep at night

## 2024-01-31 NOTE — PLAN
[TextEntry] : Start triamcinolone cream 0.1% to affected areas 2-3 times a day as needed  Refer to allergist for further evaluation  Return 2 months  [employer] Yanez student, served as chaperone and was present for the entire skin exam.

## 2024-03-25 ENCOUNTER — APPOINTMENT (OUTPATIENT)
Dept: DERMATOLOGY | Facility: CLINIC | Age: 74
End: 2024-03-25
Payer: MEDICARE

## 2024-03-25 DIAGNOSIS — L30.8 OTHER SPECIFIED DERMATITIS: ICD-10-CM

## 2024-03-25 PROCEDURE — 99213 OFFICE O/P EST LOW 20 MIN: CPT

## 2024-03-25 NOTE — HISTORY OF PRESENT ILLNESS
[FreeTextEntry1] : Itchy rash [de-identified] : Follow-up visit for 73-year-old white male first seen by me on January 31, 2024, with a few year history of a slowly progressive episodically pruritic rash diagnosed by me as an eczematous dermatitis of some type.  Treated with: Start triamcinolone cream 0.1% to affected areas 2-3 times a day as needed. -Uses this once a week Also advised to consult an allergist for further evaluation - has not done this  Able to sleep at night

## 2024-03-25 NOTE — PHYSICAL EXAM
[Alert] : alert [Oriented x 3] : ~L oriented x 3 [FreeTextEntry3] : Arms and forearms: Moderate erythematous scaly plaques Back: Diffuse ill-defined erythema and scaling Thighs and legs: Mild focal erythematous scaly plaques  No suspicious lesions seen [Well Nourished] : well nourished

## 2024-03-25 NOTE — PLAN
[TextEntry] : Advised to use the triamcinolone cream 0.1% more often as needed  Return as needed  [employer] Overton student, served as chaperone and was present for the entire skin exam.

## 2024-04-10 NOTE — ASU PATIENT PROFILE, ADULT - NS PREOP MEDICATION GIVEN
PROCEDURES:  Irrigation and debridement, tissue, down to bone, excisional 10-Apr-2024 15:33:55  Lynsey Barrett   yes

## 2024-10-16 ENCOUNTER — NON-APPOINTMENT (OUTPATIENT)
Age: 74
End: 2024-10-16

## 2024-10-16 ENCOUNTER — APPOINTMENT (OUTPATIENT)
Dept: CARDIOLOGY | Facility: CLINIC | Age: 74
End: 2024-10-16
Payer: MEDICARE

## 2024-10-16 VITALS
HEIGHT: 72 IN | HEART RATE: 71 BPM | BODY MASS INDEX: 23.03 KG/M2 | DIASTOLIC BLOOD PRESSURE: 90 MMHG | OXYGEN SATURATION: 98 % | WEIGHT: 170 LBS | SYSTOLIC BLOOD PRESSURE: 142 MMHG

## 2024-10-16 VITALS — SYSTOLIC BLOOD PRESSURE: 120 MMHG | DIASTOLIC BLOOD PRESSURE: 86 MMHG

## 2024-10-16 DIAGNOSIS — Z95.828 PRESENCE OF OTHER VASCULAR IMPLANTS AND GRAFTS: ICD-10-CM

## 2024-10-16 DIAGNOSIS — D15.1 BENIGN NEOPLASM OF HEART: ICD-10-CM

## 2024-10-16 DIAGNOSIS — I67.9 CEREBROVASCULAR DISEASE, UNSPECIFIED: ICD-10-CM

## 2024-10-16 DIAGNOSIS — R94.31 ABNORMAL ELECTROCARDIOGRAM [ECG] [EKG]: ICD-10-CM

## 2024-10-16 PROCEDURE — 99214 OFFICE O/P EST MOD 30 MIN: CPT

## 2024-10-16 PROCEDURE — 93000 ELECTROCARDIOGRAM COMPLETE: CPT

## 2024-10-23 ENCOUNTER — APPOINTMENT (OUTPATIENT)
Dept: CARDIOLOGY | Facility: CLINIC | Age: 74
End: 2024-10-23
Payer: MEDICARE

## 2024-10-23 PROCEDURE — 93978 VASCULAR STUDY: CPT

## 2024-10-25 ENCOUNTER — APPOINTMENT (OUTPATIENT)
Dept: ORTHOPEDIC SURGERY | Facility: CLINIC | Age: 74
End: 2024-10-25
Payer: MEDICARE

## 2024-10-25 DIAGNOSIS — M19.012 PRIMARY OSTEOARTHRITIS, LEFT SHOULDER: ICD-10-CM

## 2024-10-25 PROCEDURE — 20610 DRAIN/INJ JOINT/BURSA W/O US: CPT | Mod: LT

## 2024-10-25 PROCEDURE — 73030 X-RAY EXAM OF SHOULDER: CPT | Mod: LT

## 2024-10-25 PROCEDURE — 99204 OFFICE O/P NEW MOD 45 MIN: CPT | Mod: 25

## 2024-10-25 RX ORDER — MELOXICAM 15 MG/1
15 TABLET ORAL DAILY
Qty: 30 | Refills: 2 | Status: ACTIVE | COMMUNITY
Start: 2024-10-25 | End: 1900-01-01

## 2024-12-20 ENCOUNTER — APPOINTMENT (OUTPATIENT)
Dept: ORTHOPEDIC SURGERY | Facility: CLINIC | Age: 74
End: 2024-12-20
Payer: MEDICARE

## 2024-12-20 DIAGNOSIS — M19.012 PRIMARY OSTEOARTHRITIS, LEFT SHOULDER: ICD-10-CM

## 2024-12-20 PROCEDURE — 99214 OFFICE O/P EST MOD 30 MIN: CPT

## 2025-02-14 ENCOUNTER — APPOINTMENT (OUTPATIENT)
Dept: ORTHOPEDIC SURGERY | Facility: CLINIC | Age: 75
End: 2025-02-14
Payer: MEDICARE

## 2025-02-14 DIAGNOSIS — M19.012 PRIMARY OSTEOARTHRITIS, LEFT SHOULDER: ICD-10-CM

## 2025-02-14 PROCEDURE — 20610 DRAIN/INJ JOINT/BURSA W/O US: CPT | Mod: LT

## 2025-02-14 PROCEDURE — 99214 OFFICE O/P EST MOD 30 MIN: CPT | Mod: 25

## 2025-05-16 ENCOUNTER — APPOINTMENT (OUTPATIENT)
Dept: ORTHOPEDIC SURGERY | Facility: CLINIC | Age: 75
End: 2025-05-16